# Patient Record
Sex: MALE | Race: WHITE | NOT HISPANIC OR LATINO | URBAN - METROPOLITAN AREA
[De-identification: names, ages, dates, MRNs, and addresses within clinical notes are randomized per-mention and may not be internally consistent; named-entity substitution may affect disease eponyms.]

---

## 2021-09-16 ENCOUNTER — TELEPHONE ENCOUNTER (OUTPATIENT)
Dept: URBAN - METROPOLITAN AREA CLINIC 23 | Facility: CLINIC | Age: 56
End: 2021-09-16

## 2021-09-21 ENCOUNTER — LAB OUTSIDE AN ENCOUNTER (OUTPATIENT)
Dept: URBAN - METROPOLITAN AREA CLINIC 118 | Facility: CLINIC | Age: 56
End: 2021-09-21

## 2021-09-21 ENCOUNTER — OFFICE VISIT (OUTPATIENT)
Dept: URBAN - METROPOLITAN AREA CLINIC 118 | Facility: CLINIC | Age: 56
End: 2021-09-21
Payer: COMMERCIAL

## 2021-09-21 VITALS
BODY MASS INDEX: 34.07 KG/M2 | WEIGHT: 243.4 LBS | HEART RATE: 75 BPM | SYSTOLIC BLOOD PRESSURE: 137 MMHG | DIASTOLIC BLOOD PRESSURE: 83 MMHG | HEIGHT: 71 IN | TEMPERATURE: 97.7 F

## 2021-09-21 DIAGNOSIS — R94.5 LFTS ABNORMAL: ICD-10-CM

## 2021-09-21 DIAGNOSIS — Z12.11 SCREEN FOR COLON CANCER: ICD-10-CM

## 2021-09-21 DIAGNOSIS — B17.9 ACUTE HEPATITIS: ICD-10-CM

## 2021-09-21 DIAGNOSIS — K76.0 FATTY LIVER: ICD-10-CM

## 2021-09-21 PROCEDURE — 99214 OFFICE O/P EST MOD 30 MIN: CPT | Performed by: INTERNAL MEDICINE

## 2021-09-21 RX ORDER — ZOLPIDEM TARTRATE 10 MG/1
TABLET, FILM COATED ORAL
Qty: 0 | Refills: 0 | Status: ACTIVE | COMMUNITY
Start: 1900-01-01

## 2021-09-21 RX ORDER — METOPROLOL TARTRATE 50 MG/1
TABLET ORAL
Qty: 0 | Refills: 0 | Status: ACTIVE | COMMUNITY
Start: 1900-01-01

## 2021-09-21 RX ORDER — OLMESARTAN MEDOXOMIL 40 MG/1
TAKE 1 TABLET (40 MG) BY ORAL ROUTE ONCE DAILY TABLET, FILM COATED ORAL 1
Qty: 0 | Refills: 0 | Status: ACTIVE | COMMUNITY
Start: 1900-01-01

## 2021-09-21 RX ORDER — AMLODIPINE BESYLATE 5 MG
TABLET ORAL
Qty: 0 | Refills: 0 | Status: ACTIVE | COMMUNITY
Start: 1900-01-01

## 2021-09-21 NOTE — HPI-TODAY'S VISIT:
pt h/o htn, obesity, alcohol use presents for evaluation. pt noted recent increase ast/alt and has been told in the past that he has fatty liver. denies recent diet or med changes. Pt does reports some weight gain. Denies any specfici GI complaints at this time. pt h/o colon poylps 2016 and due for repeat procedure.

## 2021-11-01 ENCOUNTER — OFFICE VISIT (OUTPATIENT)
Dept: URBAN - METROPOLITAN AREA CLINIC 117 | Facility: CLINIC | Age: 56
End: 2021-11-01

## 2021-12-17 ENCOUNTER — OFFICE VISIT (OUTPATIENT)
Dept: URBAN - METROPOLITAN AREA SURGERY CENTER 23 | Facility: SURGERY CENTER | Age: 56
End: 2021-12-17

## 2022-01-05 ENCOUNTER — OFFICE VISIT (OUTPATIENT)
Dept: URBAN - METROPOLITAN AREA SURGERY CENTER 23 | Facility: SURGERY CENTER | Age: 57
End: 2022-01-05
Payer: COMMERCIAL

## 2022-01-05 DIAGNOSIS — D12.0 ADENOMA OF CECUM: ICD-10-CM

## 2022-01-05 DIAGNOSIS — Z86.010 ADENOMAS PERSONAL HISTORY OF COLONIC POLYPS: ICD-10-CM

## 2022-01-05 PROCEDURE — G8907 PT DOC NO EVENTS ON DISCHARG: HCPCS | Performed by: INTERNAL MEDICINE

## 2022-01-05 PROCEDURE — 45385 COLONOSCOPY W/LESION REMOVAL: CPT | Performed by: INTERNAL MEDICINE

## 2022-01-05 RX ORDER — OLMESARTAN MEDOXOMIL 40 MG/1
TAKE 1 TABLET (40 MG) BY ORAL ROUTE ONCE DAILY TABLET, FILM COATED ORAL 1
Qty: 0 | Refills: 0 | Status: ACTIVE | COMMUNITY
Start: 1900-01-01

## 2022-01-05 RX ORDER — ZOLPIDEM TARTRATE 10 MG/1
TABLET, FILM COATED ORAL
Qty: 0 | Refills: 0 | Status: ACTIVE | COMMUNITY
Start: 1900-01-01

## 2022-01-05 RX ORDER — AMLODIPINE BESYLATE 5 MG
TABLET ORAL
Qty: 0 | Refills: 0 | Status: ACTIVE | COMMUNITY
Start: 1900-01-01

## 2022-01-05 RX ORDER — METOPROLOL TARTRATE 50 MG/1
TABLET ORAL
Qty: 0 | Refills: 0 | Status: ACTIVE | COMMUNITY
Start: 1900-01-01

## 2022-01-12 ENCOUNTER — DASHBOARD ENCOUNTERS (OUTPATIENT)
Age: 57
End: 2022-01-12

## 2022-01-12 ENCOUNTER — OFFICE VISIT (OUTPATIENT)
Dept: URBAN - METROPOLITAN AREA CLINIC 118 | Facility: CLINIC | Age: 57
End: 2022-01-12
Payer: COMMERCIAL

## 2022-01-12 DIAGNOSIS — K76.0 FATTY LIVER: ICD-10-CM

## 2022-01-12 DIAGNOSIS — R74.8 ABNORMAL LIVER ENZYMES: ICD-10-CM

## 2022-01-12 DIAGNOSIS — K57.32 DIVERTICULITIS LARGE INTESTINE W/O PERFORATION OR ABSCESS W/O BLEEDING: ICD-10-CM

## 2022-01-12 PROBLEM — 197321007: Status: ACTIVE | Noted: 2021-09-21

## 2022-01-12 PROBLEM — 4494009: Status: ACTIVE | Noted: 2022-01-12

## 2022-01-12 PROBLEM — 166643006: Status: ACTIVE | Noted: 2022-01-12

## 2022-01-12 PROCEDURE — 99214 OFFICE O/P EST MOD 30 MIN: CPT | Performed by: INTERNAL MEDICINE

## 2022-01-12 RX ORDER — ZOLPIDEM TARTRATE 10 MG/1
TABLET, FILM COATED ORAL
Qty: 0 | Refills: 0 | Status: ACTIVE | COMMUNITY
Start: 1900-01-01

## 2022-01-12 RX ORDER — OLMESARTAN MEDOXOMIL 40 MG/1
TAKE 1 TABLET (40 MG) BY ORAL ROUTE ONCE DAILY TABLET, FILM COATED ORAL 1
Qty: 0 | Refills: 0 | Status: ACTIVE | COMMUNITY
Start: 1900-01-01

## 2022-01-12 RX ORDER — AMLODIPINE BESYLATE 5 MG
TABLET ORAL
Qty: 0 | Refills: 0 | Status: ACTIVE | COMMUNITY
Start: 1900-01-01

## 2022-01-12 RX ORDER — METOPROLOL TARTRATE 50 MG/1
TABLET ORAL
Qty: 0 | Refills: 0 | Status: ACTIVE | COMMUNITY
Start: 1900-01-01

## 2022-01-12 NOTE — HPI-TODAY'S VISIT:
57 yo WM, here for follow up of descending colon diverticulitis on 1/8/22 by CT, and was given abx.  Pt had a colonoscopy on 1/5/22 where diverticula were noted, and a cecal polyp removed with a cold snare.  He is doing better now. Of note, pt had LFTs on 1/8/22 that were normal, with normal AST/ALT. BMs regular, qd to bid. 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

## 2022-01-12 NOTE — PHYSICAL EXAM GASTROINTESTINAL
Abdomen , soft, moderate diffuse tender, nondistended , no guarding or rigidity , no masses palpable , normal bowel sounds , Liver and Spleen , no hepatomegaly present , no hepatosplenomegaly , liver nontender , spleen not palpable  Well-healed vertical midline incision.

## 2022-02-02 ENCOUNTER — OFFICE VISIT (OUTPATIENT)
Dept: URBAN - METROPOLITAN AREA SURGERY CENTER 23 | Facility: SURGERY CENTER | Age: 57
End: 2022-02-02

## 2023-04-11 NOTE — PHYSICAL EXAM HENT:
Head,  normocephalic,  atraumatic,  Face,  Face within normal limits,  Ears,  External ears within normal limits,  Nose/Nasopharynx,  External nose  normal appearance,  nares patent,  no nasal discharge,  Mouth and Throat,  Oral cavity appearance normal,  Breath odor normal,  Lips,  Appearance normal Patient